# Patient Record
Sex: MALE | Race: WHITE | NOT HISPANIC OR LATINO | URBAN - METROPOLITAN AREA
[De-identification: names, ages, dates, MRNs, and addresses within clinical notes are randomized per-mention and may not be internally consistent; named-entity substitution may affect disease eponyms.]

---
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
## 2019-11-04 ENCOUNTER — OFFICE VISIT - RIVER FALLS (OUTPATIENT)
Dept: FAMILY MEDICINE | Facility: CLINIC | Age: 19
End: 2019-11-04

## 2019-12-10 ENCOUNTER — AMBULATORY - RIVER FALLS (OUTPATIENT)
Dept: FAMILY MEDICINE | Facility: CLINIC | Age: 19
End: 2019-12-10

## 2019-12-12 ENCOUNTER — AMBULATORY - RIVER FALLS (OUTPATIENT)
Dept: FAMILY MEDICINE | Facility: CLINIC | Age: 19
End: 2019-12-12

## 2020-10-16 ENCOUNTER — AMBULATORY - RIVER FALLS (OUTPATIENT)
Dept: FAMILY MEDICINE | Facility: CLINIC | Age: 20
End: 2020-10-16

## 2022-02-11 VITALS
TEMPERATURE: 98 F | RESPIRATION RATE: 16 BRPM | HEART RATE: 52 BPM | SYSTOLIC BLOOD PRESSURE: 118 MMHG | DIASTOLIC BLOOD PRESSURE: 64 MMHG | DIASTOLIC BLOOD PRESSURE: 64 MMHG | HEART RATE: 70 BPM | BODY MASS INDEX: 23.23 KG/M2 | SYSTOLIC BLOOD PRESSURE: 126 MMHG | TEMPERATURE: 98.6 F | WEIGHT: 181 LBS | WEIGHT: 184 LBS | OXYGEN SATURATION: 98 % | HEIGHT: 74 IN

## 2022-02-12 VITALS — DIASTOLIC BLOOD PRESSURE: 72 MMHG | HEART RATE: 62 BPM | WEIGHT: 182 LBS | SYSTOLIC BLOOD PRESSURE: 118 MMHG

## 2022-02-12 VITALS
DIASTOLIC BLOOD PRESSURE: 64 MMHG | WEIGHT: 183 LBS | RESPIRATION RATE: 16 BRPM | TEMPERATURE: 97.7 F | SYSTOLIC BLOOD PRESSURE: 126 MMHG | BODY MASS INDEX: 23.66 KG/M2 | OXYGEN SATURATION: 97 % | HEART RATE: 54 BPM

## 2022-02-15 NOTE — PROGRESS NOTES
Patient:   PEDRO MONTEIRO            MRN: [id number]            FIN: [id number]               Age:   18 years     Sex:  Male     :  2000   Associated Diagnoses:   Acute maxillary sinusitis; Tonsillitis   Author:   Jorge Malin PA-C      Chief Complaint   2018 11:03 AM CST   Pt here for productive cough and sore throat x 3 1/2 weeks.        History of Present Illness   Chief complaint and symptoms noted above and confirmed with patient   as above,  3 1/2 week hx of productive cough, nasal congestion, sore throat, no fevers  had some emesis last night but he thinks that was from something he ate  no treatments for these sxs  no hx of asthma or allergies      Review of Systems   Constitutional:  No fever.    Ear/Nose/Mouth/Throat:  Nasal congestion, Sore throat.    Respiratory:  Cough, Sputum production.       Health Status   Allergies:    Allergic Reactions (Selected)  No Known Medication Allergies      Histories   Past Medical History:    No active or resolved past medical history items have been selected or recorded.   Family History:    Entire family history is negative.   Procedure history:    Extraction of wisdom tooth ([id number]).      Physical Examination   Vital Signs   2018 11:03 AM CST Temperature Tympanic 98.6 DegF    Peripheral Pulse Rate 52 bpm  LOW    Pulse Site Radial artery    Respiratory Rate 16 br/min    Systolic Blood Pressure 118 mmHg    Diastolic Blood Pressure 64 mmHg    Mean Arterial Pressure 82 mmHg    BP Site Right arm    Oxygen Saturation 98 %      Measurements from flowsheet : Measurements   2018 11:03 AM CST Height Measured - Standard 73.75 in    Weight Measured - Standard 181 lb    BSA 2.06 m2    Body Mass Index 23.39 kg/m2    Body Mass Index Percentile 57.67      General:  No acute distress.    HENT:  Tympanic membranes are clear, No sinus tenderness, oropharynx is moderately enlarged and inflamed with slight patchy exudate, nares are patent, but nasal  turbinates are inflamed and narrowed with some purulent drainage..    Neck:  Supple, mild anterior cervical lymphadenopathy, slight tenderness.    Respiratory:  Lungs are clear to auscultation.       Impression and Plan   Diagnosis     Acute maxillary sinusitis (YWG80-TJ J01.00).     Tonsillitis (AZD20-WK J03.90).     Summary:  because of these sxs and that they have been present for almost a month will treat with amoxicillin and atrovent, add expectorants/decongestants, follow up if not improving.    Orders     Orders   Pharmacy:  Atrovent 42 mcg/inh nasal spray (Prescribe): 2 spray(s), nasal, qid, PRN: as needed for cold symptoms, # 1 EA, 2 Refill(s), Type: Maintenance  amoxicillin 875 mg oral tablet (Prescribe): = 1 tab(s) ( 875 mg ), PO, BID, x 10 day(s), # 20 tab(s), 0 Refill(s), Type: Maintenance.     Orders   Charges (Evaluation and Management):  44467 office outpatient visit 15 minutes (Charge) (Order): Quantity: 1, Acute maxillary sinusitis  Tonsillitis.

## 2022-02-15 NOTE — NURSING NOTE
PPD Reading POC Entered On:  12/12/2019 12:47 PM CST    Performed On:  12/12/2019 10:30 AM CST by Caitlyn Young LPN               PPD Reading   PPD mm of Induration :   0 mm   PPD Interpretation :   Negative   Caitlyn Young LPN - 12/12/2019 12:47 PM CST

## 2022-02-15 NOTE — PROGRESS NOTES
"Chief Complaint    Rash on hands and feet. noticed it around 8:30 yesterday. Recent cough, runny nose and sore throat.  History of Present Illness      Patient here with concerns with a rash that he noticed on his hands yesterday during class. Later that day noticed that it was on his feet, pain in toes. Recent cough, sore throat and runny nose. Has not applied any lotions to areas.  Review of Systems      \"See HPI.  All other review of systems negative.\"  Physical Exam   Vitals & Measurements    T: 98.0   F (Tympanic)  HR: 70(Peripheral)  BP: 126/64     WT: 184 lb           General:  Alert and oriented, No acute distress.            Eye:  Pupils are equal, round and reactive to light, Normal conjunctiva.            HENT:  Oral mucosa is moist, no lesions          Neck:  Supple.            Respiratory: Lungs are clear to auscultation      Cardiovascular: Normal rate, Regular rhythm, No murmur, No gallop, No edema.          Gastrointestinal:  Non-distended.            Musculoskeletal:  Normal gait.            Integumentary:  Warm, blanchable, small, circular lesions on the hands and feet          Psychiatric:  Cooperative, Appropriate mood & affect, Normal judgment.           General: Alert and oriented, No acute distress.  Assessment/Plan       1. Hand, foot and mouth disease (B08.4)         Education printed, Analgesics and antipyretics as needed, symptomatic care, and follow up if not improving      I, Hortensia Dinero LPN, acted solely as a scribe for, and in the presence of Dr. Ton Mendez who performed the services.         Patient Information     Name:PEDRO MONTEIRO      Address:      [address] [place] [id number]     Sex:Male     YOB: 2000     [phone]     Emergency Contact:BRENNEN MONTEIRO     MRN:[id number]     FIN:[id number]     Location[facility]     Date of Service:10/03/2018      Primary Care Physician:       NONE , "       Attending Physician:       Ton Mendez MD, [phone]  Problem List/Past Medical History    Ongoing     No qualifying data    Historical     No qualifying data  Medications     No Recorded Medications      Allergies    No Known Medication Allergies  Social History    Smoking Status - 11/02/2017     Never smoker     Alcohol      Never, 11/06/2017  Immunizations      Vaccine Date Status      influenza virus vaccine, inactivated 10/04/2017 Recorded      meningococcal conjugate vaccine 01/25/2016 Recorded      human papillomavirus vaccine 02/02/2015 Recorded      human papillomavirus vaccine 09/29/2014 Recorded      human papillomavirus vaccine 07/29/2014 Recorded      Hep A 06/13/2011 Recorded      varicella 07/22/2010 Recorded      meningococcal conjugate vaccine 06/22/2010 Recorded      tetanus/diphth/pertuss (Tdap) adult/adol 06/22/2010 Recorded      Hep A 06/22/2010 Recorded      influenza, H1N1, inactivated 11/24/2009 Recorded      IPV 02/12/2004 Recorded      MMR (measles/mumps/rubella) 02/12/2004 Recorded      DTaP 02/12/2004 Recorded      IPV 07/12/2000 Recorded      Hib 07/12/2000 Recorded      DTaP 07/12/2000 Recorded      MMR (measles/mumps/rubella) 04/12/2000 Recorded      varicella 04/12/2000 Recorded      IPV 1999 Recorded      Hep B 1999 Recorded      Hib 1999 Recorded      DTaP 1999 Recorded      IPV 1999 Recorded      Hib 1999 Recorded      DTaP 1999 Recorded      IPV 1999 Recorded      rotavirus vaccine 1999 Recorded      Hep B 1999 Recorded      Hib 1999 Recorded      DTaP 1999 Recorded      Hep B 1999 Recorded

## 2022-02-15 NOTE — PROGRESS NOTES
Chief Complaint    Concerns with semi-productive cough x 3 weeks. ST at night and morning. No recent travel.  History of Present Illness      Patient is a 19-year-old male here for complaints of cough for 3 weeks.  He is a student at [employer].  He has a sore throat more in the evenings and morning.  There is some production to his cough.  He has not tried any over-the-counter medications.      He denies headache, fever or chills, or sinus pain and pressure.  No congestion.  He has not had any emesis.      He has no history of asthma or inhaler use.  He does not have any shortness of breath.      He would like a flu shot today  Review of Systems      Negative except as listed in HPI.  Physical Exam   Vitals & Measurements    T: 97.7   F (Tympanic)  HR: 54(Peripheral)  RR: 16  BP: 126/64  SpO2: 97%     WT: 183 lb       Vitals noted and normal.      Patient is alert, oriented and in no acute distress.      Ears: canals patent, TMs intact, no erythema and no bulging      Mouth: mucous membranes pink and moist, pharynx not erythematous      Neck is supple with no lymphadenopathy      Heart: regular rate and rhythm with no murmur      Lungs: clear to auscultation bilaterally including no wheezing with forced expiration  Assessment/Plan       Encounter for administration of vaccine (Z23)         Ordered:          influenza virus vaccine, inactivated, 0.5 mL, IM, once, (Completed)          16092 imadm prq id subq/im njxs 1 vaccine (Charge), Quantity: 1, Encounter for administration of vaccine          64279 influenza vac 4 valent prsrv free 3 yrs plus im (Charge), Quantity: 1, Encounter for administration of vaccine                URI, acute (J06.9)               Discussed some over-the-counter medications that he could take.  Cough medicine, NyQuil or DayQuil, Tylenol PM or Benadryl for bedtime.       Discussed drinking fluids, healthy diet, and getting plenty of rest.       Patient given his flu shot today.       Follow up  if not improving as anticipated.   Patient Information     Name:PEDRO MONTEIRO      Address:      [facility] [place] [id number]     Sex:Male     YOB: 2000     [phone]     Emergency Contact:BRENNEN MONTEIRO     MRN:[id number]     FIN:[id number]     Location:[facility]     Date of Service:11/04/2019      Primary Care Physician:       NONE ,       Attending Physician:       Antonina Warren MD, [phone]  Problem List/Past Medical History    Ongoing     No qualifying data    Historical     No qualifying data  Procedure/Surgical History     Extraction of wisdom tooth        Medications   No active medications  Allergies    No Known Medication Allergies  Social History    Smoking Status - 11/04/2019     Never smoker     Alcohol      Never, 11/06/2017     Tobacco      Never (less than 100 in lifetime), 11/05/2018  Family History    Family history is negative  Immunizations      Vaccine Date Status      influenza virus vaccine, inactivated 11/04/2019 Given      influenza virus vaccine, inactivated 10/17/2018 Given      influenza virus vaccine, inactivated 10/04/2017 Recorded      meningococcal conjugate vaccine 01/25/2016 Recorded      human papillomavirus vaccine 02/02/2015 Recorded      human papillomavirus vaccine 09/29/2014 Recorded      human papillomavirus vaccine 07/29/2014 Recorded      Hep A 06/13/2011 Recorded      varicella 07/22/2010 Recorded      tetanus/diphth/pertuss (Tdap) adult/adol 06/22/2010 Recorded      Hep A 06/22/2010 Recorded      meningococcal conjugate vaccine 06/22/2010 Recorded      influenza, H1N1, inactivated 11/24/2009 Recorded      MMR (measles/mumps/rubella) 02/12/2004 Recorded      IPV 02/12/2004 Recorded      DTaP 02/12/2004 Recorded      Hib 07/12/2000 Recorded      IPV 07/12/2000 Recorded      DTaP 07/12/2000 Recorded      MMR (measles/mumps/rubella) 04/12/2000 Recorded      varicella 04/12/2000 Recorded       Hib 1999 Recorded      Hep B 1999 Recorded      IPV 1999 Recorded      DTaP 1999 Recorded      Hib 1999 Recorded      IPV 1999 Recorded      DTaP 1999 Recorded      rotavirus vaccine 1999 Recorded      Hib 1999 Recorded      Hep B 1999 Recorded      IPV 1999 Recorded      DTaP 1999 Recorded      Hep B 1999 Recorded

## 2022-02-15 NOTE — NURSING NOTE
Comprehensive Intake Entered On:  11/4/2019 3:29 PM CST    Performed On:  11/4/2019 3:25 PM CST by Ely Fuchs CMA               Summary   Chief Complaint :   Concerns with semi-productive cough x 3 weeks. ST at night and morning. No recent travel.    Weight Measured :   183 lb(Converted to: 183 lb 0 oz, 83.01 kg)    Systolic Blood Pressure :   126 mmHg   Diastolic Blood Pressure :   64 mmHg   Mean Arterial Pressure :   85 mmHg   Peripheral Pulse Rate :   54 bpm (LOW)    BP Site :   Right arm   Pulse Site :   Radial artery   BP Method :   Manual   HR Method :   Electronic   Temperature Tympanic :   97.7 DegF(Converted to: 36.5 DegC)  (LOW)    Respiratory Rate :   16 br/min   Oxygen Saturation :   97 %   Ely Fuchs CMA - 11/4/2019 3:25 PM CST   Health Status   Allergies Verified? :   Yes   Medication History Verified? :   Yes   Pre-Visit Planning Status :   Not completed   Tobacco Use? :   Never smoker   Ely Fuchs CMA - 11/4/2019 3:25 PM CST   Meds / Allergies   (As Of: 11/4/2019 3:29:14 PM CST)   Allergies (Active)   No Known Medication Allergies  Estimated Onset Date:   Unspecified ; Created By:   Megan Katz CMA; Reaction Status:   Active ; Category:   Drug ; Substance:   No Known Medication Allergies ; Type:   Allergy ; Updated By:   Megan Katz CMA; Reviewed Date:   11/5/2018 11:22 AM CST        Medication List   (As Of: 11/4/2019 3:29:14 PM CST)   No Known Home Medications     Ely Fuchs CMA - 11/4/2019 3:26:53 PM      Prescription/Discharge Order    amoxicillin  :   amoxicillin ; Status:   Completed ; Ordered As Mnemonic:   amoxicillin 875 mg oral tablet ; Simple Display Line:   875 mg, 1 tab(s), PO, BID, for 10 day(s), 20 tab(s), 0 Refill(s) ; Ordering Provider:   Jorge Malin PA-C; Catalog Code:   amoxicillin ; Order Dt/Tm:   11/5/2018 11:28:55 AM CST          ipratropium nasal  :   ipratropium nasal ; Status:   Completed ; Ordered As Mnemonic:   Atrovent 42 mcg/inh  nasal spray ; Simple Display Line:   2 spray(s), nasal, qid, PRN: as needed for cold symptoms, 1 EA, 2 Refill(s) ; Ordering Provider:   Jorge Malin PA-C; Catalog Code:   ipratropium nasal ; Order Dt/Tm:   11/5/2018 11:29:39 AM CST

## 2022-02-15 NOTE — PROGRESS NOTES
Patient:   PEDRO MONTEIRO            MRN: [id number]            FIN: [id number]               Age:   17 years     Sex:  Male     :  2000   Associated Diagnoses:   Ingrown left big toenail   Author:   Clay De Jesus MD      Chief Complaint   2017 2:26 PM CDT    c/o Left Big Toe pain x 2-3 days      History of Present Illness   see chief complaint as noted above and confirmed with the patient   17 year old male presenting with left big toe pain. He states it started a few  days ago but has had some redness around nail       Review of Systems   Constitutional:  Negative.    Ear/Nose/Mouth/Throat:  Negative.    Respiratory:  Negative.    Cardiovascular:  Negative.    Gastrointestinal:  Negative.    Musculoskeletal:  Negative.    Integumentary:  ingrown toenail.    Neurologic:  Negative.    Psychiatric:  Negative.              Health Status   Allergies:    Allergic Reactions (Selected)  No Known Medication Allergies   Medications:  (Selected)      Problem list:    No problem items selected or recorded.      Histories   Past Medical History:    No active or resolved past medical history items have been selected or recorded.   Family History:    No family history items have been selected or recorded.   Procedure history:    No active procedure history items have been selected or recorded.   Social History:             No active social history items have been recorded.      Physical Examination   Vital Signs   2017 2:26 PM CDT Peripheral Pulse Rate 62 bpm    Systolic Blood Pressure 118 mmHg    Diastolic Blood Pressure 72 mmHg    Mean Arterial Pressure 87 mmHg      Measurements from flowsheet : Measurements   2017 2:26 PM CDT    Weight Measured - Standard                182.0 lb     General:  Alert and oriented, No acute distress.    Eye:  Pupils are equal, round and reactive to light, Normal conjunctiva.    HENT:  Oral mucosa is moist.    Neck:  Supple.    Respiratory:  Respirations are  non-labored.    Cardiovascular:  Normal rate, Regular rhythm, No edema.    Gastrointestinal:  Non-distended.    Musculoskeletal:  Normal gait.    Integumentary:  Warm, No rash.    Psychiatric:  Cooperative, Appropriate mood & affect, Normal judgment.       Review / Management   Results review      Impression and Plan   Diagnosis     Ingrown left big toenail (WRS13-GP L60.0).     Course:  digital block of toe with 4 cc lidocaine  with excellent anesthesia the edge or the left great toenail were lifted and excised  minimal bleeding and procedure tolerated well  gave instruction on pushing skin back and lifting nail  Reviewed expected course, what to watch for and when to return..    Plan:  Discussed options and he will have the nail removed  [facility], acted solely as a scribe for, and in the presence of Dr. Clay De Jesus who performed the service..

## 2022-02-15 NOTE — NURSING NOTE
PPD Administration POC Entered On:  12/10/2019 9:57 AM CST    Performed On:  12/10/2019 9:57 AM CST by Keren Andrea RN               PPD Administration   PPD Insertion Site :   Right forearm   PPD Amount Administered (mL) :   0.1 mL   Keren Andrea RN - 12/10/2019 9:57 AM CST   Details   Expiration Date :   3/25/2022 CDT   Lot#/Manufacture :   [id number]    :   [employer]   POC Test Comments :   Instructed patient to return in 48-72 hours. Patient expressed understanding.   Keren Andrea RN - 12/10/2019 9:57 AM CST